# Patient Record
Sex: FEMALE | Race: AMERICAN INDIAN OR ALASKA NATIVE | Employment: UNEMPLOYED
[De-identification: names, ages, dates, MRNs, and addresses within clinical notes are randomized per-mention and may not be internally consistent; named-entity substitution may affect disease eponyms.]

---

## 2017-06-10 ENCOUNTER — HEALTH MAINTENANCE LETTER (OUTPATIENT)
Age: 52
End: 2017-06-10

## 2017-11-02 ENCOUNTER — HOSPITAL ENCOUNTER (EMERGENCY)
Facility: CLINIC | Age: 52
Discharge: HOME | End: 2017-11-02
Attending: EMERGENCY MEDICINE | Admitting: EMERGENCY MEDICINE
Payer: COMMERCIAL

## 2017-11-02 VITALS
TEMPERATURE: 97.8 F | OXYGEN SATURATION: 100 % | DIASTOLIC BLOOD PRESSURE: 80 MMHG | RESPIRATION RATE: 16 BRPM | SYSTOLIC BLOOD PRESSURE: 136 MMHG | HEART RATE: 86 BPM

## 2017-11-02 DIAGNOSIS — R41.82 ALTERED MENTAL STATUS, UNSPECIFIED ALTERED MENTAL STATUS TYPE: ICD-10-CM

## 2017-11-02 LAB — GLUCOSE BLDC GLUCOMTR-MCNC: 86 MG/DL (ref 70–99)

## 2017-11-02 PROCEDURE — 99285 EMERGENCY DEPT VISIT HI MDM: CPT | Performed by: EMERGENCY MEDICINE

## 2017-11-02 PROCEDURE — 93005 ELECTROCARDIOGRAM TRACING: CPT | Performed by: EMERGENCY MEDICINE

## 2017-11-02 PROCEDURE — 00000146 ZZHCL STATISTIC GLUCOSE BY METER IP

## 2017-11-02 PROCEDURE — 99284 EMERGENCY DEPT VISIT MOD MDM: CPT | Mod: 25 | Performed by: EMERGENCY MEDICINE

## 2017-11-02 PROCEDURE — 93010 ELECTROCARDIOGRAM REPORT: CPT | Mod: Z6 | Performed by: EMERGENCY MEDICINE

## 2017-11-02 ASSESSMENT — ENCOUNTER SYMPTOMS
DIFFICULTY URINATING: 0
AGITATION: 0
SHORTNESS OF BREATH: 0
FEVER: 0
COUGH: 0
HEADACHES: 0
ABDOMINAL PAIN: 0
COLOR CHANGE: 0
NAUSEA: 1
BACK PAIN: 0
NECK PAIN: 0
CHILLS: 0
VOMITING: 1
NECK STIFFNESS: 0
BRUISES/BLEEDS EASILY: 0
ADENOPATHY: 0

## 2017-11-02 NOTE — ED PROVIDER NOTES
History     Chief Complaint   Patient presents with     Drug Overdose     Pt at home became unresponsive. 1 mg narcan given at home & Pt awoke approx 1315     HPI  Flora Morrow is a 52 year old female who presents to the Emergency Department via EMS for evaluation of a drug overdose. Per EMS report, the patient was a heroin overdose that was administered Narcan at home. In the ED, the patient reports she had returned home from the store and felt an asthma attack coming on from walking in the cold. She states she asked her son to prepare her at home nebulizer, but he became panicked due to her shortness of breath and called her niece to assist him. She says her niece arrived and believed the patient was overdosing on heroin so she placed her on the floor and administered Narcan nasally and into her left arm. She states she has Narcan at home because her community has lots of drug overdoses. She states her family tried to shove ice into her clothes and put her in a cold bathtub before EMS arrived. She denies any IV drug use presently or in the past. She says she takes OTC opiates for a pinched nerve in her back, but she is unaware which medication. In addition, she states she takes gabapentin normally and her niece also tried to shove gabapentin down her throat causing her to vomit. The patient repeatedly states that she was having an asthma attack that her family mistook as a drug overdose.     Past Medical History:   Diagnosis Date     Anxiety      Asthma      Hepatitis C      Herniated disc        Past Surgical History:   Procedure Laterality Date     ARTHROSCOPY KNEE  2005    Dr. Guerrero Narayanan     DENTAL SURGERY      Extraction of all teeth       Family History   Problem Relation Age of Onset     Hypertension Mother      CEREBROVASCULAR DISEASE Mother      DIABETES Father      DIABETES Brother        Social History   Substance Use Topics     Smoking status: Current Every Day Smoker     Packs/day: 0.50      Types: Cigarettes     Smokeless tobacco: Never Used     Alcohol use No       No current facility-administered medications for this encounter.      Current Outpatient Prescriptions   Medication     OXYCODONE HCL PO     ALPRAZolam (XANAX PO)     ibuprofen (ADVIL,MOTRIN) 800 MG tablet     albuterol (PROVENTIL HFA: VENTOLIN HFA) 108 (90 BASE) MCG/ACT inhaler     albuterol (2.5 MG/3ML) 0.083% nebulizer solution     CHOLECALCIFEROL PO        Allergies   Allergen Reactions     Morphine Anaphylaxis     Codeine Sulfate Itching     Penicillins Swelling     Feels Throat constriction           I have reviewed the Medications, Allergies, Past Medical and Surgical History, and Social History in the Epic system.    Review of Systems   Constitutional: Negative for chills and fever.   HENT: Negative for congestion.    Eyes: Negative for visual disturbance.   Respiratory: Negative for cough and shortness of breath.    Cardiovascular: Negative for chest pain.   Gastrointestinal: Positive for nausea and vomiting. Negative for abdominal pain.   Endocrine: Negative for polyuria.   Genitourinary: Negative for difficulty urinating.   Musculoskeletal: Negative for back pain, neck pain and neck stiffness.   Skin: Negative for color change.   Neurological: Negative for headaches.   Hematological: Negative for adenopathy. Does not bruise/bleed easily.   Psychiatric/Behavioral: Negative for agitation and behavioral problems.       Physical Exam   BP: 105/75  Pulse: 85  Heart Rate: 84  Temp: 97.5  F (36.4  C)  Resp: 14  SpO2: 91 %      Physical Exam   Constitutional: She is oriented to person, place, and time. She appears well-developed and well-nourished. No distress.   HENT:   Head: Normocephalic and atraumatic.   Mouth/Throat: Oropharynx is clear and moist. No oropharyngeal exudate.   Eyes: Conjunctivae and EOM are normal. Pupils are equal, round, and reactive to light. No scleral icterus.   Pinpoint pupils bilaterally.   Neck: Normal range  of motion.   Cardiovascular: Normal rate, normal heart sounds and intact distal pulses.    Pulmonary/Chest: Effort normal and breath sounds normal. No respiratory distress. She has no wheezes. She has no rales.   Abdominal: Soft. Bowel sounds are normal. There is no tenderness.   Musculoskeletal: Normal range of motion. She exhibits no edema or tenderness.   Neurological: She is alert and oriented to person, place, and time. No cranial nerve deficit. She exhibits normal muscle tone. Coordination normal.   Skin: Skin is warm. No rash noted. She is not diaphoretic.   Psychiatric: She has a normal mood and affect. Her behavior is normal. Judgment and thought content normal.   Nursing note and vitals reviewed.      ED Course     ED Course     Procedures             EKG Interpretation:      Interpreted by Harleen Nix  Time reviewed: 2:34 PM  Symptoms at time of EKG: overdose   Rhythm: normal sinus   Rate: normal  Axis: normal  Ectopy: none  Conduction: normal  ST Segments/ T Waves: No ST-T wave changes  Q Waves: none  Comparison to prior: No old EKG available    Clinical Impression: normal EKG            Critical Care time:  none           Labs Ordered and Resulted from Time of ED Arrival Up to the Time of Departure from the ED   GLUCOSE BY METER   PERIPHERAL IV CATHETER   PULSE OXIMETRY NURSING   CARDIAC CONTINUOUS MONITORING            Assessments & Plan (with Medical Decision Making)   52-year-old woman presenting with possible overdose. Differential diagnosis opiate dependence, overdose.    After recent history and physical exam, the appears to be in no acute distress. She is awake and alert. She does have pinpoint pupils. Vital signs are normal. She ll be observed in emergency department.     This part of the document was transcribed by Julia Echeverria Scribcuong.    3:43 PM  Patient re-assessed. She is awake and alert. She has been awake and alert in the emergency department the entire time. At this point she  is requesting to be discharged and I believe that she is stable for discharge. Gait is normal at discharge and she is awake, alert, and oriented to time, place, person, and situation.    I have reviewed the nursing notes.    I have reviewed the findings, diagnosis, plan and need for follow up with the patient.    New Prescriptions    No medications on file       Final diagnoses:   Altered mental status, unspecified altered mental status type   I, Adelfo Richardson, am serving as a trained medical scribe to document services personally performed by Roland Nix MD, based on the provider's statements to me.      I, Roland Nix MD, was physically present and have reviewed and verified the accuracy of this note documented by Adelfo Richardson.       11/2/2017   [facility], [place], EMERGENCY DEPARTMENT     Harleen Nix MD  11/02/17 1544

## 2017-11-02 NOTE — ED AVS SNAPSHOT
[facility], Emergency Department    [address]    [place] [address]    Phone:  [phone]    Fax:  [phone]                                       Flora Morrow   MRN: [id number]    Department:  [facility], Emergency Department   Date of Visit:  11/2/2017           Patient Information     Date Of Birth          1965        Your diagnoses for this visit were:     Altered mental status, unspecified altered mental status type        You were seen by Harleen Nix MD.        Discharge Instructions       Please make an appointment to follow up with Your Primary Care Provider in 1-2 days if you have any concerns. Please use your medications as prescribed and instructed.      24 Hour Appointment Hotline       To make an appointment at any [facility] clinic, call [phone] ([phone]). If you don't have a family doctor or clinic, we will help you find one. [facility] clinics are conveniently located to serve the needs of you and your family.             Review of your medicines      Our records show that you are taking the medicines listed below. If these are incorrect, please call your family doctor or clinic.        Dose / Directions Last dose taken    * albuterol (2.5 MG/3ML) 0.083% neb solution   Dose:  1 ampule        Take 1 ampule by nebulization every 4 hours as needed.   Refills:  0        * albuterol 108 (90 BASE) MCG/ACT Inhaler   Commonly known as:  PROAIR HFA/PROVENTIL HFA/VENTOLIN HFA   Dose:  2 puff   Quantity:  1 Inhaler        Inhale 2 puffs into the lungs 4 times daily.   Refills:  0        CHOLECALCIFEROL PO   Dose:  1000 Units        Take 1,000 Units by mouth daily.   Refills:  0        ibuprofen 800 MG tablet   Commonly known as:  ADVIL/MOTRIN   Dose:  800 mg        Take 800 mg by mouth every 8 hours as needed.   Refills:  0        OXYCODONE HCL PO   Dose:  10 mg   Indication:  Chronic Pain        Take 10 mg by mouth every 12 hours   Refills:  0        XANAX PO  "  Dose:  1 mg   Indication:  Feeling Anxious        Take 1 mg by mouth 2 times daily as needed for anxiety   Refills:  0        * Notice:  This list has 2 medication(s) that are the same as other medications prescribed for you. Read the directions carefully, and ask your doctor or other care provider to review them with you.            Procedures and tests performed during your visit     Cardiac Continuous Monitoring    EKG 12-lead, tracing only    Glucose by meter    Peripheral IV: Standard    Pulse oximetry      Orders Needing Specimen Collection     None      Pending Results     No orders found from 10/31/2017 to 11/3/2017.            Pending Culture Results     No orders found from 10/31/2017 to 11/3/2017.            Pending Results Instructions     If you had any lab results that were not finalized at the time of your Discharge, you can call the ED Lab Result RN at [phone]. You will be contacted by this team for any positive Lab results or changes in treatment. The nurses are available 7 days a week from 10A to 6:30P.  You can leave a message 24 hours per day and they will return your call.        Thank you for choosing [facility]       Thank you for choosing [facility] for your care. Our goal is always to provide you with excellent care. Hearing back from our patients is one way we can continue to improve our services. Please take a few minutes to complete the written survey that you may receive in the mail after you visit with us. Thank you!        [email or web address] Information     [facility] lets you send messages to your doctor, view your test results, renew your prescriptions, schedule appointments and more. To sign up, go to [email or web address] . Click on \"Log in\" on the left side of the screen, which will take you to the Welcome page. Then click on \"Sign up Now\" on the right side of the page.     You will be asked to enter the access code listed below, as well as some personal information. Please follow the " directions to create your username and password.     Your access code is: [email or web address]  Expires: 2018  4:10 PM     Your access code will  in 90 days. If you need help or a new code, please call your [facility] clinic or [phone].        Care EveryWhere ID     This is your Care EveryWhere ID. This could be used by other organizations to access your [facility] medical records  [id number]        Equal Access to Services     RAMOS BURTON : Hadii aad ku hadasho Sokya, waaxda luqadaha, qaybta kaalmada adeegyada, tejal kidd . So [facility] [phone].    ATENCIÓN: Si habla español, tiene a colmenares disposición servicios gratuitos de asistencia lingüística. Llame al [phone].    We comply with applicable federal civil rights laws and [place] laws. We do not discriminate on the basis of race, color, national origin, age, disability, sex, sexual orientation, or gender identity.            After Visit Summary       This is your record. Keep this with you and show to your community pharmacist(s) and doctor(s) at your next visit.

## 2017-11-02 NOTE — DISCHARGE INSTRUCTIONS
Please make an appointment to follow up with Your Primary Care Provider in 1-2 days if you have any concerns. Please use your medications as prescribed and instructed.

## 2017-11-02 NOTE — ED AVS SNAPSHOT
[facility], [place], Emergency Department    [address]    [facility] [address]    Phone:  [phone]    Fax:  [phone]                                       Flora Morrow   MRN: [id number]    Department:  [facility], Emergency Department   Date of Visit:  11/2/2017           After Visit Summary Signature Page     I have received my discharge instructions, and my questions have been answered. I have discussed any challenges I see with this plan with the nurse or doctor.    ..........................................................................................................................................  Patient/Patient Representative Signature      ..........................................................................................................................................  Patient Representative Print Name and Relationship to Patient    ..................................................               ................................................  Date                                            Time    ..........................................................................................................................................  Reviewed by Signature/Title    ...................................................              ..............................................  Date                                                            Time

## 2017-11-03 LAB — INTERPRETATION ECG - MUSE: NORMAL
